# Patient Record
(demographics unavailable — no encounter records)

---

## 2025-05-16 NOTE — REASON FOR VISIT
[Symptom and Test Evaluation] : symptom and test evaluation [CV Risk Factors and Non-Cardiac Disease] : CV risk factors and non-cardiac disease [Structural Heart and Valve Disease] : structural heart and valve disease

## 2025-05-16 NOTE — HISTORY OF PRESENT ILLNESS
[FreeTextEntry1] : 79 year old F with HTN, DM, breast cancer s/p L lumpectomy who presents for cardiac evaluation of CP.  Meds: Losartan 50, Farxiga, metformin, atorvastatin 10  Pt reports 1 year of intermittent L sided CP underneath the L armpit. It occurs randomly without clear triggers, lasting seconds. It occurs approximately once a week. She also reports SOB on exertion which she attributes to her asthma. She only did 2:30 min of Gil protocol a few years ago due to wheezing. She denies palpitation, dizziness, LOC, orthopnea, PND, or LE edema. Of note, pt states her BPs at Dr. Riddle's office are usually normal.  Last seen by Dr. Lane 5/26/22 - TTE 7/29/22 showed hyperdynamic LVEF 80%, mild basal septal hypertrophy, mildly elevated intracavitary gradient of 30 mmHg. Pt did STR 7/29/22 for 2:30 min, equivocal EKG changes.

## 2025-07-22 NOTE — HISTORY OF PRESENT ILLNESS
[FreeTextEntry1] : Pt reports continued rare episodes of L sided chest pain under the L armpit, lasting seconds. She feels stable otherwise.  5/16/25 - Pt reports 1 year of intermittent L sided CP underneath the L armpit. It occurs randomly without clear triggers, lasting seconds. It occurs approximately once a week. She also reports SOB on exertion which she attributes to her asthma. She only did 2:30 min of Gil protocol a few years ago due to wheezing. She denies palpitation, dizziness, LOC, orthopnea, PND, or LE edema. Of note, pt states her BPs at Dr. Riddle's office are usually normal.  Last seen by Dr. Lane 5/26/22 - TTE 7/29/22 showed hyperdynamic LVEF 80%, mild basal septal hypertrophy, mildly elevated intracavitary gradient of 30 mmHg. Pt did STR 7/29/22 for 2:30 min, equivocal EKG changes.

## 2025-07-22 NOTE — ASSESSMENT
[FreeTextEntry1] : 79 year old F with HTN, HLD, DM, breast cancer s/p L lumpectomy and radiation who presents for f/u.  1) CP, atypical in nature, lasting seconds 2) Asymmetric septal hypertrophy, noted on previous TTE 3) HTN, HLD, DM - She is euvolemic on exam - TTE 5/16/25 showed hyperdynamic LV function without LVOT obstruction, normal RV size/function and no significant valvular disease - CCTA showed Ca score 502, minimal RCA, normal LM, mild LAD, normal LCx - I advised pt to increase atorvastatin to 20mg daily (last LDL was 100 on 10mg) - I advised pt to take ASA 81mg daily for medical management of mild CAD  4) Follow-up, 6 months

## 2025-07-22 NOTE — REASON FOR VISIT
[Symptom and Test Evaluation] : symptom and test evaluation [CV Risk Factors and Non-Cardiac Disease] : CV risk factors and non-cardiac disease [Coronary Artery Disease] : coronary artery disease [FreeTextEntry1] : 79 year old F with HTN, HLD, DM, breast cancer s/p L lumpectomy and radiation who presents for f/u.  Meds: Losartan 50, Farxiga, metformin, atorvastatin 10 --> 20mg daily, ASA 81mg daily  CCTA showed Ca score 502, minimal RCA, normal LM, mild LAD, normal LCx